# Patient Record
Sex: FEMALE | Race: WHITE | Employment: FULL TIME | URBAN - METROPOLITAN AREA
[De-identification: names, ages, dates, MRNs, and addresses within clinical notes are randomized per-mention and may not be internally consistent; named-entity substitution may affect disease eponyms.]

---

## 2017-02-24 LAB
AMB EXT ANTIBODY SCREEN: NEGATIVE
AMB EXT CHLAMYDIA, NUCLEIC ACID AMP: NEGATIVE
AMB EXT GONOCOCCUS, NUCLEIC ACID AMP: NEGATIVE
AMB EXT HBSAG SCREEN: NON REACTIVE
AMB EXT HEMATOCRIT: 39.1
AMB EXT HEMOGLOBIN: 13.3
AMB EXT HIV AG AB COMBO: NON REACTIVE
AMB EXT MCV: 90.8
AMB EXT PLATELETS: 267
AMB EXT RH FACTOR: POSITIVE
AMB EXT RUBELLA ANTIBODIES, IGG: 4.1
AMB EXT TSH: 1.05 MIU/ML
AMB EXT URINE CULTURE ROUTINE: NO GROWTH

## 2017-06-14 LAB
AMB EXT GLUCOSE 1HR OB: 105
AMB EXT HEMATOCRIT: 35.9
AMB EXT HEMOGLOBIN: 12.1
AMB EXT PLATELETS: 271

## 2017-08-21 ENCOUNTER — TELEPHONE (OUTPATIENT)
Dept: OBGYN CLINIC | Facility: CLINIC | Age: 32
End: 2017-08-21

## 2017-08-21 NOTE — TELEPHONE ENCOUNTER
LM on personal voicemail, we did not receive patient's records yet from Jermaine Guerrero our fax number left of message [phone]

## 2017-08-22 ENCOUNTER — OFFICE VISIT (OUTPATIENT)
Dept: OBGYN CLINIC | Facility: CLINIC | Age: 32
End: 2017-08-22

## 2017-08-22 DIAGNOSIS — IMO0002 MEET AND GREET FOR EXPECTANT MOTHER: Primary | ICD-10-CM

## 2017-08-22 NOTE — PROGRESS NOTES
Here for meet & greet. Currently @ [address] however recently found out all CNM's but 1 are leaving and may likey have MD at birth. Had long first labor and pushed x 2 hrs, feels needs that support from CNM that trusts at her birth.  Juan Sharpe

## 2017-08-23 ENCOUNTER — TELEPHONE (OUTPATIENT)
Dept: OBGYN CLINIC | Facility: CLINIC | Age: 32
End: 2017-08-23

## 2017-08-23 NOTE — TELEPHONE ENCOUNTER
Requested pt to contact [facility] to have her PN Record faxed to us for her OBN Appt tomorrow. Pt agrees with plan.

## 2017-08-24 ENCOUNTER — NURSE ONLY (OUTPATIENT)
Dept: OBGYN CLINIC | Facility: CLINIC | Age: 32
End: 2017-08-24

## 2017-08-24 ENCOUNTER — TELEPHONE (OUTPATIENT)
Dept: OBGYN CLINIC | Facility: CLINIC | Age: 32
End: 2017-08-24

## 2017-08-24 VITALS — WEIGHT: 165.25 LBS | BODY MASS INDEX: 26.25 KG/M2 | HEIGHT: 66.5 IN

## 2017-08-24 DIAGNOSIS — Z34.83 ENCOUNTER FOR SUPERVISION OF OTHER NORMAL PREGNANCY IN THIRD TRIMESTER: Primary | ICD-10-CM

## 2017-08-24 RX ORDER — CHOLECALCIFEROL (VITAMIN D3) 25 MCG
1 TABLET,CHEWABLE ORAL DAILY
COMMUNITY
End: 2019-07-15

## 2017-08-24 NOTE — TELEPHONE ENCOUNTER
LMTCB. Requested pt call back with the information of how far along she was in the pregnancy when she delivered her 1st baby. Need to enter into her PN record.

## 2017-08-24 NOTE — PROGRESS NOTES
Pt is a transfer of care at 36 weeks from [employer]. PN Record, labs and US are in [facility]. Pt has a cat but she does not change the litter box. TOXO titers ordered with missing NOB Labs.   Pt had an abnormal 20 week US with choroid plexus cyst

## 2017-08-30 ENCOUNTER — INITIAL PRENATAL (OUTPATIENT)
Dept: OBGYN CLINIC | Facility: CLINIC | Age: 32
End: 2017-08-30

## 2017-08-30 VITALS
HEART RATE: 77 BPM | DIASTOLIC BLOOD PRESSURE: 72 MMHG | SYSTOLIC BLOOD PRESSURE: 112 MMHG | WEIGHT: 163 LBS | BODY MASS INDEX: 26 KG/M2

## 2017-08-30 DIAGNOSIS — Z11.3 SCREEN FOR STD (SEXUALLY TRANSMITTED DISEASE): ICD-10-CM

## 2017-08-30 DIAGNOSIS — Z34.83 ENCOUNTER FOR SUPERVISION OF OTHER NORMAL PREGNANCY IN THIRD TRIMESTER: Primary | ICD-10-CM

## 2017-08-30 LAB
APPEARANCE: CLEAR
MULTISTIX LOT#: NORMAL NUMERIC
PH, URINE: 5 (ref 4.5–8)
SPECIFIC GRAVITY: 1 (ref 1–1.03)
STREP GP B CULT OB: POSITIVE
URINE-COLOR: YELLOW
UROBILINOGEN,SEMI-QN: 0 MG/DL (ref 0–1.9)

## 2017-08-30 RX ORDER — BREAST PUMP
EACH MISCELLANEOUS
Qty: 1 EACH | Refills: 0 | Status: SHIPPED | OUTPATIENT
Start: 2017-08-30 | End: 2019-02-27 | Stop reason: ALTCHOICE

## 2017-08-30 NOTE — PROGRESS NOTES
Active fetus  No signs of labor  Increasing BH contractions. Signs & symptoms of labor reviewed.  All questions answered

## 2017-09-02 LAB
SIGNAL TO CUT-OFF: 0.01
VITAMIN D, 25-OH, TOTAL: 30 NG/ML (ref 30–100)

## 2017-09-04 LAB
CHLAMYDIA TRACHOMATIS$RNA, TMA: NOT DETECTED
NEISSERIA GONORRHOEAE$RNA, TMA: NOT DETECTED

## 2017-09-05 ENCOUNTER — TELEPHONE (OUTPATIENT)
Dept: OBGYN CLINIC | Facility: CLINIC | Age: 32
End: 2017-09-05

## 2017-09-05 PROBLEM — B95.1 POSITIVE GBS TEST: Status: ACTIVE | Noted: 2017-09-05

## 2017-09-05 NOTE — TELEPHONE ENCOUNTER
Pt was advised she is to call immediately if her water bag breaks and she will go to the hospital ASAP. Pt will need to be given the IV abx prior to delivery. Pt agrees with plan.

## 2017-09-05 NOTE — TELEPHONE ENCOUNTER
Pt states she received phone call today with results. Wants to know what these results mean for delivery.

## 2017-09-06 ENCOUNTER — ROUTINE PRENATAL (OUTPATIENT)
Dept: OBGYN CLINIC | Facility: CLINIC | Age: 32
End: 2017-09-06

## 2017-09-06 VITALS
SYSTOLIC BLOOD PRESSURE: 117 MMHG | BODY MASS INDEX: 27 KG/M2 | WEIGHT: 167.19 LBS | HEART RATE: 91 BPM | DIASTOLIC BLOOD PRESSURE: 82 MMHG

## 2017-09-06 DIAGNOSIS — Z34.83 ENCOUNTER FOR SUPERVISION OF OTHER NORMAL PREGNANCY IN THIRD TRIMESTER: Primary | ICD-10-CM

## 2017-09-06 LAB
APPEARANCE: CLEAR
MULTISTIX LOT#: NORMAL NUMERIC
PH, URINE: 8 (ref 4.5–8)
SPECIFIC GRAVITY: 1.01 (ref 1–1.03)
URINE-COLOR: YELLOW
UROBILINOGEN,SEMI-QN: 0.2 MG/DL (ref 0–1.9)

## 2017-09-06 NOTE — PROGRESS NOTES
Active baby. No signs of labor. Reviewed GBS positive and PCN allergic will need vanco or clindamycin. Discussed birth plan in addition 10 mins.

## 2017-09-12 ENCOUNTER — ROUTINE PRENATAL (OUTPATIENT)
Dept: OBGYN CLINIC | Facility: CLINIC | Age: 32
End: 2017-09-12

## 2017-09-12 VITALS
SYSTOLIC BLOOD PRESSURE: 130 MMHG | BODY MASS INDEX: 27 KG/M2 | HEART RATE: 64 BPM | DIASTOLIC BLOOD PRESSURE: 79 MMHG | WEIGHT: 167 LBS

## 2017-09-12 DIAGNOSIS — Z34.83 ENCOUNTER FOR SUPERVISION OF OTHER NORMAL PREGNANCY IN THIRD TRIMESTER: Primary | ICD-10-CM

## 2017-09-12 LAB
APPEARANCE: CLEAR
MULTISTIX LOT#: NORMAL NUMERIC
PH, URINE: 7 (ref 4.5–8)
SPECIFIC GRAVITY: 1.02 (ref 1–1.03)
URINE-COLOR: YELLOW
UROBILINOGEN,SEMI-QN: 0 MG/DL (ref 0–1.9)

## 2017-09-12 PROCEDURE — 59425 ANTEPARTUM CARE ONLY: CPT | Performed by: ADVANCED PRACTICE MIDWIFE

## 2017-09-12 NOTE — PROGRESS NOTES
Baby active. No SOL. Getting more nervous about birth as last labor long & difficult. Lab did not do toxo so will have to re-draw. Importance of active FM, SOL reviewed. Post dates plans reviewed.

## 2017-09-16 ENCOUNTER — HOSPITAL ENCOUNTER (INPATIENT)
Facility: HOSPITAL | Age: 32
LOS: 2 days | Discharge: HOME | End: 2017-09-18
Attending: ADVANCED PRACTICE MIDWIFE | Admitting: OBSTETRICS & GYNECOLOGY
Payer: COMMERCIAL

## 2017-09-16 PROBLEM — Z37.9 NORMAL LABOR (HCC): Status: ACTIVE | Noted: 2017-09-16

## 2017-09-16 PROBLEM — Z34.90 PREGNANCY: Status: ACTIVE | Noted: 2017-09-16

## 2017-09-16 PROBLEM — Z37.9 NORMAL LABOR: Status: ACTIVE | Noted: 2017-09-16

## 2017-09-16 PROBLEM — Z34.90 PREGNANCY (HCC): Status: ACTIVE | Noted: 2017-09-16

## 2017-09-16 LAB
ANTIBODY SCREEN: NEGATIVE
ERYTHROCYTE [DISTWIDTH] IN BLOOD BY AUTOMATED COUNT: 13 % (ref 11–15)
HCT VFR BLD AUTO: 42.5 % (ref 35–48)
HGB BLD-MCNC: 14.5 G/DL (ref 12–16)
MCH RBC QN AUTO: 31.6 PG (ref 27–32)
MCHC RBC AUTO-ENTMCNC: 34.1 G/DL (ref 32–37)
MCV RBC AUTO: 92.6 FL (ref 80–100)
PLATELET # BLD AUTO: 226 K/UL (ref 140–400)
PMV BLD AUTO: 9.1 FL (ref 7.4–10.3)
RBC # BLD AUTO: 4.59 M/UL (ref 3.7–5.4)
RH BLOOD TYPE: POSITIVE
WBC # BLD AUTO: 15.8 K/UL (ref 4–11)

## 2017-09-16 PROCEDURE — 0HQ9XZZ REPAIR PERINEUM SKIN, EXTERNAL APPROACH: ICD-10-PCS | Performed by: ADVANCED PRACTICE MIDWIFE

## 2017-09-16 RX ORDER — SODIUM CHLORIDE 0.9 % (FLUSH) 0.9 %
10 SYRINGE (ML) INJECTION AS NEEDED
Status: DISCONTINUED | OUTPATIENT
Start: 2017-09-16 | End: 2017-09-16 | Stop reason: HOSPADM

## 2017-09-16 RX ORDER — DEXTROSE, SODIUM CHLORIDE, SODIUM LACTATE, POTASSIUM CHLORIDE, AND CALCIUM CHLORIDE 5; .6; .31; .03; .02 G/100ML; G/100ML; G/100ML; G/100ML; G/100ML
125 INJECTION, SOLUTION INTRAVENOUS CONTINUOUS
Status: DISCONTINUED | OUTPATIENT
Start: 2017-09-16 | End: 2017-09-16 | Stop reason: HOSPADM

## 2017-09-16 RX ORDER — CLINDAMYCIN PHOSPHATE 900 MG/50ML
900 INJECTION INTRAVENOUS EVERY 8 HOURS
Status: DISCONTINUED | OUTPATIENT
Start: 2017-09-16 | End: 2017-09-16 | Stop reason: HOSPADM

## 2017-09-16 RX ORDER — TRISODIUM CITRATE DIHYDRATE AND CITRIC ACID MONOHYDRATE 500; 334 MG/5ML; MG/5ML
30 SOLUTION ORAL AS NEEDED
Status: DISCONTINUED | OUTPATIENT
Start: 2017-09-16 | End: 2017-09-16 | Stop reason: HOSPADM

## 2017-09-16 RX ORDER — IBUPROFEN 600 MG/1
600 TABLET ORAL ONCE AS NEEDED
Status: DISCONTINUED | OUTPATIENT
Start: 2017-09-16 | End: 2017-09-16 | Stop reason: HOSPADM

## 2017-09-16 RX ORDER — TERBUTALINE SULFATE 1 MG/ML
0.25 INJECTION, SOLUTION SUBCUTANEOUS AS NEEDED
Status: DISCONTINUED | OUTPATIENT
Start: 2017-09-16 | End: 2017-09-16 | Stop reason: HOSPADM

## 2017-09-16 RX ORDER — LIDOCAINE HYDROCHLORIDE 10 MG/ML
30 INJECTION, SOLUTION EPIDURAL; INFILTRATION; INTRACAUDAL; PERINEURAL ONCE
Status: DISCONTINUED | OUTPATIENT
Start: 2017-09-16 | End: 2017-09-16 | Stop reason: HOSPADM

## 2017-09-16 RX ORDER — AMMONIA INHALANTS 0.04 G/.3ML
0.3 INHALANT RESPIRATORY (INHALATION) AS NEEDED
Status: DISCONTINUED | OUTPATIENT
Start: 2017-09-16 | End: 2017-09-16 | Stop reason: HOSPADM

## 2017-09-17 LAB
BASOPHILS # BLD: 0 K/UL (ref 0–0.2)
BASOPHILS NFR BLD: 0 %
EOSINOPHIL # BLD: 0.1 K/UL (ref 0–0.7)
EOSINOPHIL NFR BLD: 0 %
ERYTHROCYTE [DISTWIDTH] IN BLOOD BY AUTOMATED COUNT: 13 % (ref 11–15)
HCT VFR BLD AUTO: 39.1 % (ref 35–48)
HGB BLD-MCNC: 13.3 G/DL (ref 12–16)
LYMPHOCYTES # BLD: 1.7 K/UL (ref 1–4)
LYMPHOCYTES NFR BLD: 10 %
MCH RBC QN AUTO: 31.8 PG (ref 27–32)
MCHC RBC AUTO-ENTMCNC: 34.1 G/DL (ref 32–37)
MCV RBC AUTO: 93.2 FL (ref 80–100)
MONOCYTES # BLD: 1.2 K/UL (ref 0–1)
MONOCYTES NFR BLD: 7 %
NEUTROPHILS # BLD AUTO: 13.2 K/UL (ref 1.8–7.7)
NEUTROPHILS NFR BLD: 82 %
PLATELET # BLD AUTO: 209 K/UL (ref 140–400)
PMV BLD AUTO: 9.6 FL (ref 7.4–10.3)
RBC # BLD AUTO: 4.2 M/UL (ref 3.7–5.4)
WBC # BLD AUTO: 16.1 K/UL (ref 4–11)

## 2017-09-17 PROCEDURE — 59410 OBSTETRICAL CARE: CPT | Performed by: ADVANCED PRACTICE MIDWIFE

## 2017-09-17 RX ORDER — DOCUSATE SODIUM 100 MG/1
100 CAPSULE, LIQUID FILLED ORAL 2 TIMES DAILY
Status: DISCONTINUED | OUTPATIENT
Start: 2017-09-17 | End: 2017-09-18

## 2017-09-17 RX ORDER — IBUPROFEN 200 MG
200 TABLET ORAL EVERY 4 HOURS PRN
Status: DISCONTINUED | OUTPATIENT
Start: 2017-09-17 | End: 2017-09-18

## 2017-09-17 RX ORDER — AMMONIA INHALANTS 0.04 G/.3ML
0.3 INHALANT RESPIRATORY (INHALATION) AS NEEDED
Status: DISCONTINUED | OUTPATIENT
Start: 2017-09-17 | End: 2017-09-18

## 2017-09-17 RX ORDER — SIMETHICONE 80 MG
80 TABLET,CHEWABLE ORAL 3 TIMES DAILY PRN
Status: DISCONTINUED | OUTPATIENT
Start: 2017-09-17 | End: 2017-09-18

## 2017-09-17 RX ORDER — IBUPROFEN 600 MG/1
600 TABLET ORAL EVERY 4 HOURS PRN
Status: DISCONTINUED | OUTPATIENT
Start: 2017-09-17 | End: 2017-09-18

## 2017-09-17 RX ORDER — BISACODYL 10 MG
10 SUPPOSITORY, RECTAL RECTAL ONCE AS NEEDED
Status: DISCONTINUED | OUTPATIENT
Start: 2017-09-17 | End: 2017-09-18

## 2017-09-17 RX ORDER — PRENATAL VIT,CAL 76/IRON/FOLIC 29 MG-1 MG
1 TABLET ORAL DAILY
Status: DISCONTINUED | OUTPATIENT
Start: 2017-09-17 | End: 2017-09-18

## 2017-09-17 RX ORDER — DIAPER,BRIEF,INFANT-TODD,DISP
1 EACH MISCELLANEOUS EVERY 6 HOURS PRN
Status: DISCONTINUED | OUTPATIENT
Start: 2017-09-17 | End: 2017-09-18

## 2017-09-17 RX ORDER — IBUPROFEN 200 MG
400 TABLET ORAL EVERY 4 HOURS PRN
Status: DISCONTINUED | OUTPATIENT
Start: 2017-09-17 | End: 2017-09-18

## 2017-09-17 NOTE — PROGRESS NOTES
[place] [facility]    OB/GYNE Progress Note      [address] [facility] Patient Status:  Inpatient    1985 MRN [id number]   Location [facility] 3SE Attending Marcus Bro, [address] Day # 1 PCP Deanna Schwartz MD       As cracking or bleeding, + milky discharge  Fundus firm, non-tender, 1FB below umbilicus  Lochia: small rubra  Perineum: well approximated, no swelling, no hematoma  Calves: Non-tender, no edema, Neg Homans    DVT Risk Score: low  VTE Prophylaxis Ordered: no

## 2017-09-17 NOTE — L&D DELIVERY NOTE
[place] [facility] - [facility]    Vaginal Delivery Note    [address] [facility] Patient Status:  Inpatient    1985 MRN [id number]   Location [unfilled] Attending Jaime Johnland, [address] [facility] Day # 0 PCP Sandro Rao MD     Delivery     Suzan Rivers

## 2017-09-17 NOTE — LACTATION NOTE
LACTATION NOTE - MOTHER      Evaluation Type: Inpatient    Problems identified  Problems identified: Knowledge deficit    Maternal history  Other/comment: Amenorrhea, GBS+    Breastfeeding goal  Breastfeeding goal: To maintain breast milk feeding per patie

## 2017-09-17 NOTE — H&P
[facility] Patient Status:  Inpatient    1985 MRN [id number]   Location [address] [facility] Attending Poonam Almonte, [address] Day # 0 PCP Traci Rice, Review of Systems:   As documented in HPI    Physical Exam:   Temp:  [98.4 °F (36.9 °C)] 98.4 °F (36.9 °C)  Pulse:  [74-78] 78  Resp:  [18] 18  BP: (125-128)/(76-81) 125/81    Constitutional: alert, appears stated age and cooperative  Abdomen: fundus

## 2017-09-17 NOTE — PLAN OF CARE
ANXIETY    • Will report anxiety at manageable levels Progressing        PAIN - ADULT    • Verbalizes/displays adequate comfort level or patient's stated pain goal Progressing        Patient Centered Care    • Patient preferences are identified and Winnie Journey

## 2017-09-18 VITALS
TEMPERATURE: 98 F | RESPIRATION RATE: 16 BRPM | DIASTOLIC BLOOD PRESSURE: 72 MMHG | SYSTOLIC BLOOD PRESSURE: 117 MMHG | HEART RATE: 68 BPM

## 2017-09-18 LAB — T PALLIDUM AB SER QL: NEGATIVE

## 2017-09-18 NOTE — LACTATION NOTE
LACTATION NOTE - MOTHER      Evaluation Type: Inpatient    Problems identified  Problems identified: Knowledge deficit;Milk supply WNL    Maternal history  Other/comment: Amenorrhea, GBS+    Breastfeeding goal  Breastfeeding goal: To maintain breast milk f

## 2017-09-18 NOTE — PROGRESS NOTES
[place] [facility] - [facility]    OB/GYNE Progress Note      [address] [facility] Patient Status:  Inpatient    1985 MRN [id number]   Location [facility] 3SE Attending Parrish Hanley, [address] Day # 2 PCP Keith Sims MD       As 09/16/2017   WBC 16.1 (H) 09/17/2017   HGB 13.3 09/17/2017   HCT 39.1 09/17/2017    09/17/2017   TSH 1.050 02/24/2017         Lab Results  Component Value Date   SPECGRAVITY 1.020 09/12/2017   GLUUR NEG 09/12/2017   NITRITE NEG 09/12/2017

## 2017-09-18 NOTE — DISCHARGE SUMMARY
[place] [facility] - [facility]    Discharge Summary    Remedios Garcia Patient Status:  Inpatient    1985 MRN [id number]   Location [facility] 3SE Attending Marcus Bro, [address] Day # 2       Delivering OB Clinician: [facility]

## 2017-09-18 NOTE — LACTATION NOTE
This note was copied from a baby's chart.   LACTATION NOTE - INFANT    Evaluation Type  Evaluation Type: Inpatient    Problems & Assessment  Problems Diagnosed or Identified: Shallow latch  Problems: comment/detail: Advised to hold infant in close to encour

## 2017-09-20 ENCOUNTER — TELEPHONE (OUTPATIENT)
Dept: PEDIATRICS CLINIC | Facility: CLINIC | Age: 32
End: 2017-09-20

## 2017-09-20 ENCOUNTER — POSTPARTUM (OUTPATIENT)
Dept: OBGYN CLINIC | Facility: CLINIC | Age: 32
End: 2017-09-20

## 2017-09-20 VITALS
BODY MASS INDEX: 23.93 KG/M2 | HEART RATE: 91 BPM | SYSTOLIC BLOOD PRESSURE: 122 MMHG | HEIGHT: 67 IN | WEIGHT: 152.5 LBS | TEMPERATURE: 99 F | DIASTOLIC BLOOD PRESSURE: 87 MMHG

## 2017-09-20 PROCEDURE — 99213 OFFICE O/P EST LOW 20 MIN: CPT | Performed by: ADVANCED PRACTICE MIDWIFE

## 2017-09-20 RX ORDER — CEPHALEXIN 500 MG/1
500 CAPSULE ORAL 4 TIMES DAILY
Qty: 28 CAPSULE | Refills: 0 | Status: SHIPPED | OUTPATIENT
Start: 2017-09-20 | End: 2017-11-06

## 2017-09-20 NOTE — TELEPHONE ENCOUNTER
Pt thinks she has a breast infection ,,Pt had a low grade fever yesterday and breast tenderness , pt is breast feeding ,

## 2017-09-20 NOTE — TELEPHONE ENCOUNTER
Spoke with pt who reports she thinks she has breast infection. Pt states both breasts are tender and warm to the touch. Pt reports symptoms began this Monday.  Pt states she checked her temperature yesterday and it was 99.5, has not checked temperature toda

## 2017-09-20 NOTE — PROGRESS NOTES
HPI:   Natividad Brown is a 27year old female who presents for a breast complaint. 4 days PP and has noticed red areas and tender lumps. Had 99.4 temp yesterday. No chills, + fatigue.      Wt Readings from Last 3 Encounters:  09/20/17 : 152 lb BMI 23.88 kg/m²   GENERAL: well developed, well nourished,in no apparent distress  SKIN: no rashes,no suspicious lesions  HEENT: atraumatic, normocephalic  BREAST: symmetrical, nipples intact, bilateral milk discharge, RIght breast with palpable axillary 3

## 2017-09-20 NOTE — TELEPHONE ENCOUNTER
Pt was advised per MBW she is to take Lecithin 1200 mg 3-4 times daily to combat the plugged ducts. Pt agrees with plan.

## 2017-09-28 ENCOUNTER — TELEPHONE (OUTPATIENT)
Dept: OBGYN CLINIC | Facility: CLINIC | Age: 32
End: 2017-09-28

## 2017-09-30 NOTE — TELEPHONE ENCOUNTER
Notified MBW that we have been unable to reach pt. She states she spoke w/ pt's  & he said she was doing better.  No further need to try & contact pt per MBW

## 2017-11-06 ENCOUNTER — POSTPARTUM (OUTPATIENT)
Dept: OBGYN CLINIC | Facility: CLINIC | Age: 32
End: 2017-11-06

## 2017-11-06 VITALS
HEART RATE: 61 BPM | WEIGHT: 146 LBS | BODY MASS INDEX: 23 KG/M2 | DIASTOLIC BLOOD PRESSURE: 75 MMHG | SYSTOLIC BLOOD PRESSURE: 117 MMHG

## 2017-11-06 NOTE — PROGRESS NOTES
Patient here for postpartum check-up. Vaginal delivery @ 6 weeks ago with ISABEL ALONSO. Breastfeeding exclusively, on demand. Baby with adequate weight gain. Denies fevers, chills, body aches and flu-like symptoms.   Denies abdominal pain, no pelvic pain, repor

## 2019-01-16 ENCOUNTER — TELEPHONE (OUTPATIENT)
Dept: INTERNAL MEDICINE CLINIC | Facility: CLINIC | Age: 34
End: 2019-01-16

## 2019-01-16 NOTE — TELEPHONE ENCOUNTER
Patient called and stated she has a cough, and requested to be seen at 12 noon today, Please advise.

## 2019-01-17 ENCOUNTER — OFFICE VISIT (OUTPATIENT)
Dept: INTERNAL MEDICINE CLINIC | Facility: CLINIC | Age: 34
End: 2019-01-17
Payer: COMMERCIAL

## 2019-01-17 VITALS
DIASTOLIC BLOOD PRESSURE: 74 MMHG | OXYGEN SATURATION: 97 % | RESPIRATION RATE: 16 BRPM | SYSTOLIC BLOOD PRESSURE: 126 MMHG | HEIGHT: 67 IN | HEART RATE: 64 BPM | BODY MASS INDEX: 20.09 KG/M2 | WEIGHT: 128 LBS

## 2019-01-17 DIAGNOSIS — B96.89 ACUTE BACTERIAL RHINOSINUSITIS: Primary | ICD-10-CM

## 2019-01-17 DIAGNOSIS — J01.90 ACUTE BACTERIAL RHINOSINUSITIS: Primary | ICD-10-CM

## 2019-01-17 PROCEDURE — 99203 OFFICE O/P NEW LOW 30 MIN: CPT | Performed by: NURSE PRACTITIONER

## 2019-01-17 RX ORDER — CODEINE PHOSPHATE AND GUAIFENESIN 10; 100 MG/5ML; MG/5ML
5 SOLUTION ORAL NIGHTLY PRN
Qty: 120 ML | Refills: 0 | Status: SHIPPED | OUTPATIENT
Start: 2019-01-17 | End: 2019-02-05

## 2019-01-17 RX ORDER — AMOXICILLIN AND CLAVULANATE POTASSIUM 875; 125 MG/1; MG/1
1 TABLET, FILM COATED ORAL 2 TIMES DAILY
Qty: 20 TABLET | Refills: 0 | Status: SHIPPED | OUTPATIENT
Start: 2019-01-17 | End: 2019-01-27

## 2019-01-17 NOTE — PROGRESS NOTES
HPI:    Patient ID: Arun Mathew is a 28year old female. Patient presents with:  Cough: sinus congestoin, cough, runny nose x1 month      Sinus Problem   This is a recurrent problem. The current episode started more than 1 month ago.  The Substance and Sexual Activity      Alcohol use: No      Drug use: No    Other Topics      Concerns:        Blood Transfusions: No        Caffeine Concern: No        Occupational Exposure: No        Weight Concern: No        Special Diet: No        Exercise LMP  (Approximate)   SpO2 97%   BMI 20.05 kg/m²   Physical Exam   Vitals reviewed. Constitutional: She is oriented to person, place, and time. She appears well-developed and well-nourished.    HENT:   Right Ear: Tympanic membrane and ear canal normal.   L

## 2019-02-05 ENCOUNTER — OFFICE VISIT (OUTPATIENT)
Dept: INTERNAL MEDICINE CLINIC | Facility: CLINIC | Age: 34
End: 2019-02-05
Payer: COMMERCIAL

## 2019-02-05 ENCOUNTER — HOSPITAL ENCOUNTER (OUTPATIENT)
Dept: GENERAL RADIOLOGY | Age: 34
Discharge: HOME | End: 2019-02-05
Attending: NURSE PRACTITIONER
Payer: COMMERCIAL

## 2019-02-05 ENCOUNTER — TELEPHONE (OUTPATIENT)
Dept: INTERNAL MEDICINE CLINIC | Facility: CLINIC | Age: 34
End: 2019-02-05

## 2019-02-05 VITALS
DIASTOLIC BLOOD PRESSURE: 72 MMHG | BODY MASS INDEX: 20.09 KG/M2 | OXYGEN SATURATION: 97 % | TEMPERATURE: 98 F | WEIGHT: 128 LBS | HEIGHT: 67 IN | HEART RATE: 78 BPM | SYSTOLIC BLOOD PRESSURE: 128 MMHG | RESPIRATION RATE: 18 BRPM

## 2019-02-05 DIAGNOSIS — R05.9 COUGH: ICD-10-CM

## 2019-02-05 DIAGNOSIS — R06.02 SHORTNESS OF BREATH: ICD-10-CM

## 2019-02-05 DIAGNOSIS — J01.90 ACUTE BACTERIAL RHINOSINUSITIS: ICD-10-CM

## 2019-02-05 DIAGNOSIS — R05.9 COUGH: Primary | ICD-10-CM

## 2019-02-05 DIAGNOSIS — B96.89 ACUTE BACTERIAL RHINOSINUSITIS: ICD-10-CM

## 2019-02-05 PROCEDURE — 99213 OFFICE O/P EST LOW 20 MIN: CPT | Performed by: NURSE PRACTITIONER

## 2019-02-05 PROCEDURE — 71046 X-RAY EXAM CHEST 2 VIEWS: CPT | Performed by: NURSE PRACTITIONER

## 2019-02-05 RX ORDER — ALBUTEROL SULFATE 90 UG/1
1 AEROSOL, METERED RESPIRATORY (INHALATION) EVERY 6 HOURS PRN
Qty: 1 INHALER | Refills: 3 | Status: SHIPPED | OUTPATIENT
Start: 2019-02-05 | End: 2019-02-18

## 2019-02-05 RX ORDER — BENZONATATE 200 MG/1
200 CAPSULE ORAL 3 TIMES DAILY PRN
Qty: 20 CAPSULE | Refills: 0 | Status: SHIPPED | OUTPATIENT
Start: 2019-02-05 | End: 2019-02-18

## 2019-02-05 NOTE — TELEPHONE ENCOUNTER
Patient c/o rib pain due to increased coughing. States she was seen and treated on Saturday for strep throat with Amoxicillin x10 days. Patient states the cough has persisted for 2 months and it is now getting worse.  States she has tried otc cough suppre

## 2019-02-05 NOTE — TELEPHONE ENCOUNTER
Patient called and stated she was seen 2-3 weeks ago for a cough, and given antibiotics, but then she went to a clinic and had strep throat, and was given antibiotics again. She is coughing more, and wants to be advised.

## 2019-02-05 NOTE — PROGRESS NOTES
HPI:    Patient ID: Adriana Steele is a 28year old female. Pt has had a cough x1mo that has not improved.  She was also seen in urgent care Saturday and was given amoxicillin for a positive strep test. Her congestion, fatigue, and sore thro decreased breath sounds in the right lower field. Musculoskeletal:   Tenderness over right lateral ribs   Skin: Skin is warm and dry. Psychiatric: She has a normal mood and affect. Her behavior is normal.   Vitals reviewed.   /72   Pulse 78   Temp

## 2019-02-13 ENCOUNTER — TELEPHONE (OUTPATIENT)
Dept: INTERNAL MEDICINE CLINIC | Facility: CLINIC | Age: 34
End: 2019-02-13

## 2019-02-13 RX ORDER — BUDESONIDE AND FORMOTEROL FUMARATE DIHYDRATE 160; 4.5 UG/1; UG/1
1 AEROSOL RESPIRATORY (INHALATION) 2 TIMES DAILY
Qty: 1 INHALER | Refills: 0 | COMMUNITY
Start: 2019-02-13 | End: 2019-02-18

## 2019-02-13 NOTE — TELEPHONE ENCOUNTER
Spoke with pt she continues to cough despite tessalon and albuterol. She has coughing fits that last up to 30 minutes. She is currently breast feeding as well. Per Dr. Florina Guillory Symbicort 1 puff BID x 7 days. Sample ready for .      D/w pt above

## 2019-02-13 NOTE — TELEPHONE ENCOUNTER
Patient called and stated she was seen for a cough, and completed an Xray but still hasn't subsided. Please advise.

## 2019-02-14 ENCOUNTER — TELEPHONE (OUTPATIENT)
Dept: INTERNAL MEDICINE CLINIC | Facility: CLINIC | Age: 34
End: 2019-02-14

## 2019-02-14 NOTE — TELEPHONE ENCOUNTER
Symbicort information from [employer]:     Lactation  Clinical Summary   budesonide inhaled: may use while breastfeeding; no known risk of infant harm based on human data and minimal drug excretion into milk; no human data available to assess effects on mil

## 2019-02-18 ENCOUNTER — TELEPHONE (OUTPATIENT)
Dept: INTERNAL MEDICINE CLINIC | Facility: CLINIC | Age: 34
End: 2019-02-18

## 2019-02-18 DIAGNOSIS — R05.9 COUGH: Primary | ICD-10-CM

## 2019-02-18 RX ORDER — IPRATROPIUM BROMIDE AND ALBUTEROL SULFATE 2.5; .5 MG/3ML; MG/3ML
3 SOLUTION RESPIRATORY (INHALATION) EVERY 8 HOURS PRN
Qty: 90 CONTAINER | Refills: 0 | Status: SHIPPED | OUTPATIENT
Start: 2019-02-18 | End: 2019-03-11

## 2019-02-18 NOTE — TELEPHONE ENCOUNTER
Spoke with rep at [facility]# [phone], and they are contracted with patient's [employer]. Please fax order, patient's insurance, and demographics to [phone] ATTN: INTAKE.

## 2019-02-18 NOTE — TELEPHONE ENCOUNTER
Per Dr. Camden Orellana stop Symbicort and Albuterol and begin Duoneb 1 nebule TID PRN, Nebulizer with kit. D/w pt above she expressed understanding. Rxs sent.

## 2019-02-18 NOTE — TELEPHONE ENCOUNTER
Order and requested documents faxed. Pt notified and given contact information to f/u on delivery and was advised to continue inhalers until she receives the nebulizer. Pt expressed understanding.

## 2019-02-18 NOTE — TELEPHONE ENCOUNTER
Patient says she has been using Symbicort since Thursday, which has helped her cough during the day, but she is still up most of the night coughing, to the point that her ribs hurt. Please c/b on work # to advise what else she can do for treatment.

## 2019-02-18 NOTE — TELEPHONE ENCOUNTER
[facility] called back stating orders now need to be placed electronically and they will fax over information. Attempted to place order electronically and [facility] has blocked the website.     Called and spoke with customer service they will have

## 2019-02-18 NOTE — TELEPHONE ENCOUNTER
Patient called and stated her nebulizer is not covered by her insurance. She was told to call back if not covered, to be given an alternative.

## 2019-02-18 NOTE — TELEPHONE ENCOUNTER
I called [facility]bigail Borges, at [phone], and spoke with a rep. They are contracted with [facility], and please fax order, patient's demographics, and insurance cards to [phone]. Patient supplies, nebulizer will be shipped from their [place] branch.

## 2019-02-21 ENCOUNTER — TELEPHONE (OUTPATIENT)
Dept: INTERNAL MEDICINE CLINIC | Facility: CLINIC | Age: 34
End: 2019-02-21

## 2019-02-21 NOTE — TELEPHONE ENCOUNTER
The pt was seen in the office on 2/5/2019 for a cough. The pt was treated with amoxicillin and Cheratussin from a pervious ED visit. The pt was then given an Rx for tessalon and albulterol.    Chest x-ray was completed and was wnl:   CONCLUSION:  No acute f

## 2019-02-21 NOTE — TELEPHONE ENCOUNTER
Patient called and stated she used her nebulizer for the first time, and noticed her cough is getting worse- she is wheezing; asking if she should use her 2 inhalers instead?

## 2019-02-25 ENCOUNTER — TELEPHONE (OUTPATIENT)
Dept: INTERNAL MEDICINE CLINIC | Facility: CLINIC | Age: 34
End: 2019-02-25

## 2019-02-25 DIAGNOSIS — R05.3 CHRONIC COUGH: Primary | ICD-10-CM

## 2019-02-25 NOTE — TELEPHONE ENCOUNTER
Pt states she is still coughing at night for about 2-3 hours,she is still taking her nebulizer and wondering what else she can do, call back

## 2019-02-25 NOTE — TELEPHONE ENCOUNTER
The pt states is up for 2-3 hours at night due to coughing. The pt states has been sleeping upright. The pt states is still having a dry cough. The pt has started using the nebulizer that has helped during the day.  The pt is having a worse cough at nig

## 2019-02-27 PROCEDURE — 86615 BORDETELLA ANTIBODY: CPT | Performed by: INTERNAL MEDICINE

## 2019-02-27 PROCEDURE — 36415 COLL VENOUS BLD VENIPUNCTURE: CPT | Performed by: INTERNAL MEDICINE

## 2019-03-11 ENCOUNTER — OFFICE VISIT (OUTPATIENT)
Dept: OBGYN CLINIC | Facility: CLINIC | Age: 34
End: 2019-03-11
Payer: COMMERCIAL

## 2019-03-11 VITALS
HEIGHT: 67.5 IN | WEIGHT: 132.63 LBS | HEART RATE: 71 BPM | DIASTOLIC BLOOD PRESSURE: 75 MMHG | SYSTOLIC BLOOD PRESSURE: 116 MMHG | BODY MASS INDEX: 20.57 KG/M2

## 2019-03-11 DIAGNOSIS — Z01.419 ENCOUNTER FOR ANNUAL ROUTINE GYNECOLOGICAL EXAMINATION: Primary | ICD-10-CM

## 2019-03-11 DIAGNOSIS — Z12.4 SCREENING FOR MALIGNANT NEOPLASM OF CERVIX: ICD-10-CM

## 2019-03-11 PROCEDURE — 99395 PREV VISIT EST AGE 18-39: CPT | Performed by: ADVANCED PRACTICE MIDWIFE

## 2019-03-11 NOTE — PROGRESS NOTES
HPI:    Patient ID: Veronique Robbins is a 28year old female. She presents for annual exam. She denies history of medical problems or any health complaints. Last pap 2/2015, denies history of abnormal pap. Reports periods are regular.  Patient's la well-nourished. No distress. Neck: Normal range of motion. Neck supple. No thyromegaly present. Cardiovascular: Normal rate, regular rhythm and normal heart sounds.    Pulmonary/Chest: Effort normal and breath sounds normal. Right breast exhibits no inv healthy diet and exercise as well as breast self awareness. Reviewed preconception counseling and encouraged PNV. RTC annually or PRN.          Orders Placed This Encounter      THINPREP TIS AND HPV MRNA E6/E7      Meds This Visit:  Requested Shira [phone]

## 2019-03-13 LAB — HPV MRNA E6/E7: NOT DETECTED

## 2020-05-11 ENCOUNTER — TELEMEDICINE (OUTPATIENT)
Dept: INTERNAL MEDICINE CLINIC | Facility: CLINIC | Age: 35
End: 2020-05-11
Payer: COMMERCIAL

## 2020-05-11 DIAGNOSIS — T78.1XXA ALLERGIC REACTION TO FOOD, INITIAL ENCOUNTER: Primary | ICD-10-CM

## 2020-05-11 PROCEDURE — 99213 OFFICE O/P EST LOW 20 MIN: CPT | Performed by: NURSE PRACTITIONER

## 2020-05-11 NOTE — PROGRESS NOTES
HPI:    Patient ID: Rudy Gomez is a 27year old female. Patient presents with:  Rash    This visit is conducted using Telemedicine with live, interactive video and audio. Noted rash this am to arms, abdomen, chest and legs this morning.  Dexter Winkler Component Value Date    WBC 16.1 (H) 09/17/2017    RBC 4.20 09/17/2017    HGB 13.3 09/17/2017    HCT 39.1 09/17/2017    MCV 93.2 09/17/2017    MCH 31.8 09/17/2017    MCHC 34.1 09/17/2017    RDW 13.0 09/17/2017     09/17/2017    MPV 9.6 09/17/2017

## 2020-05-13 ENCOUNTER — LAB ENCOUNTER (OUTPATIENT)
Dept: LAB | Facility: HOSPITAL | Age: 35
End: 2020-05-13
Attending: NURSE PRACTITIONER
Payer: COMMERCIAL

## 2020-05-13 DIAGNOSIS — Z20.822 ENCOUNTER FOR LABORATORY TESTING FOR COVID-19 VIRUS: Primary | ICD-10-CM

## 2020-05-13 DIAGNOSIS — Z20.822 ENCOUNTER FOR LABORATORY TESTING FOR COVID-19 VIRUS: ICD-10-CM

## 2022-05-21 LAB — AMB EXT COVID-19 RESULT: DETECTED

## 2022-05-24 ENCOUNTER — TELEPHONE (OUTPATIENT)
Dept: INTERNAL MEDICINE CLINIC | Facility: CLINIC | Age: 37
End: 2022-05-24

## 2022-05-24 NOTE — TELEPHONE ENCOUNTER
Patient scheduled appointment.      Future Appointments   Date Time Provider Leandro Roblero   6/3/2022 12:30 PM YAN Coello [facility] [address]

## 2022-05-24 NOTE — TELEPHONE ENCOUNTER
Pt took covid test on 5/21, result was positive    Onset of symptoms was also on 5/21    Pt will be traveling to [place] on 6/17  Returning 7/5    Pt is looking for documentation for travel she no longer has covid.

## 2022-05-24 NOTE — TELEPHONE ENCOUNTER
Confirmed COVID f/u call  Symptom onset: 5/21  Positive test date: 5/21   Isolation period ends: 5/26 if fever free and symptoms have improved  Mask wearing begins: 5/26 through 5/31  Restrictions end:   Temperature: denies fever   Cough: pt states no   Shortness of breath: pt states no   Any other symptoms: congestion   Pulse ox:  Does not have an pulse ox at home to be able to assess    D/w pt isolation period, mask wearing period and when restrictions end. Pt was advised of the following:      - when to get covid booster if applicable  - contact office with worsening non emergent symptoms or further questions  - proceed to ER if any of the following begin:  worsening difficulty breathing/shortness of breath, high fever not controlled by fever reducing agents and pulse ox less than 90% if monitoring. Pt expressed understanding. Pt states she is travelling to [facility] in June and wants to ensure she is negative. She states she needs documentation that she is ok to travel and has recovered from [facility]. Pt states she is going to try to find the document she needs to be completed and will provide it to us. Advised patient to please upload through [address] or can drop off here at the office. Patient verbalized understanding.

## 2022-05-24 NOTE — TELEPHONE ENCOUNTER
Called patient and advised can be seen in office June 1st or after for a follow up and to provide this documentation.

## 2022-06-03 ENCOUNTER — OFFICE VISIT (OUTPATIENT)
Dept: INTERNAL MEDICINE CLINIC | Facility: CLINIC | Age: 37
End: 2022-06-03
Payer: COMMERCIAL

## 2022-06-03 VITALS
HEART RATE: 55 BPM | RESPIRATION RATE: 17 BRPM | TEMPERATURE: 98 F | BODY MASS INDEX: 19.62 KG/M2 | SYSTOLIC BLOOD PRESSURE: 106 MMHG | WEIGHT: 125 LBS | HEIGHT: 67 IN | OXYGEN SATURATION: 98 % | DIASTOLIC BLOOD PRESSURE: 72 MMHG

## 2022-06-03 DIAGNOSIS — Z00.00 LABORATORY EXAM ORDERED AS PART OF ROUTINE GENERAL MEDICAL EXAMINATION: ICD-10-CM

## 2022-06-03 DIAGNOSIS — Z91.018 MULTIPLE FOOD ALLERGIES: ICD-10-CM

## 2022-06-03 DIAGNOSIS — Z00.00 ANNUAL PHYSICAL EXAM: Primary | ICD-10-CM

## 2022-06-03 PROBLEM — U07.1 COVID: Status: ACTIVE | Noted: 2022-06-03

## 2022-06-03 PROBLEM — B95.1 POSITIVE GBS TEST: Status: RESOLVED | Noted: 2017-09-05 | Resolved: 2022-06-03

## 2022-06-03 PROBLEM — Z34.90 PREGNANCY: Status: RESOLVED | Noted: 2017-09-16 | Resolved: 2022-06-03

## 2022-06-03 PROBLEM — Z34.90 PREGNANCY (HCC): Status: RESOLVED | Noted: 2017-09-16 | Resolved: 2022-06-03

## 2022-06-03 PROBLEM — Z37.9 NORMAL LABOR: Status: RESOLVED | Noted: 2017-09-16 | Resolved: 2022-06-03

## 2022-06-03 PROBLEM — Z37.9 NORMAL LABOR (HCC): Status: RESOLVED | Noted: 2017-09-16 | Resolved: 2022-06-03

## 2022-06-03 PROCEDURE — 3078F DIAST BP <80 MM HG: CPT

## 2022-06-03 PROCEDURE — 99395 PREV VISIT EST AGE 18-39: CPT

## 2022-06-03 PROCEDURE — 3008F BODY MASS INDEX DOCD: CPT

## 2022-06-03 PROCEDURE — 3074F SYST BP LT 130 MM HG: CPT

## 2024-05-10 ENCOUNTER — OFFICE VISIT (OUTPATIENT)
Dept: INTERNAL MEDICINE CLINIC | Facility: CLINIC | Age: 39
End: 2024-05-10
Payer: COMMERCIAL

## 2024-05-10 VITALS
HEART RATE: 58 BPM | SYSTOLIC BLOOD PRESSURE: 122 MMHG | RESPIRATION RATE: 14 BRPM | BODY MASS INDEX: 21.19 KG/M2 | HEIGHT: 67 IN | OXYGEN SATURATION: 98 % | TEMPERATURE: 97 F | DIASTOLIC BLOOD PRESSURE: 70 MMHG | WEIGHT: 135 LBS

## 2024-05-10 DIAGNOSIS — Z00.00 ROUTINE PHYSICAL EXAMINATION: Primary | ICD-10-CM

## 2024-05-10 DIAGNOSIS — Z00.00 LABORATORY EXAM ORDERED AS PART OF ROUTINE GENERAL MEDICAL EXAMINATION: ICD-10-CM

## 2024-05-10 DIAGNOSIS — Z12.31 ENCOUNTER FOR SCREENING MAMMOGRAM FOR BREAST CANCER: ICD-10-CM

## 2024-05-10 DIAGNOSIS — N64.4 BREAST PAIN IN FEMALE: ICD-10-CM

## 2024-05-10 PROBLEM — U07.1 COVID: Status: RESOLVED | Noted: 2022-06-03 | Resolved: 2024-05-10

## 2024-05-10 PROCEDURE — 99395 PREV VISIT EST AGE 18-39: CPT | Performed by: PHYSICIAN ASSISTANT

## 2024-05-10 NOTE — PATIENT INSTRUCTIONS
Have labs drawn, fasting 8-10 hours prior (water before is ok).    If breast pain persists: contact us and we will order diagnostic imaging to be done now (instead of waiting until after your birthday for mammogram)      Gynecologists we recommend:  Dr Kylie Vann  Or any other [facility]  [phone]  
[Follow-Up: _____] : a [unfilled] follow-up visit

## 2024-05-10 NOTE — PROGRESS NOTES
Wellness Exam    CC: Patient is presenting for a wellness exam    HPI:   Concerns: has some b/l lateral breast pain, symmetric, nursing daughter at night only,     Diet is general, balanced, exercise: not regular, busy, 3 kids.    Gyne:     Health Maintenance   Topic Date Due    Pap Smear  05/10/2025 (Originally 3/11/2022)         Denies pelvic pain, abnormal discharge or genital lesions.    Breast Cancer Screening:  No recommendations at this time   Regular self breast exam: y.     Colon cancer screening:  No recommendations at this time       Pertinent Family History:   Family History   Problem Relation Age of Onset    Cancer Maternal Grandmother         Breast      Past Medical History:    Amenorrhea    Irregular menses prior to 1st pregnancy    Bronchospasm    COVID    Dyspnea    Normal labor (HCC)     (normal spontaneous vaginal delivery) (HCC)     History reviewed. No pertinent surgical history.  Social History     Socioeconomic History    Marital status:      Spouse name: Oscar Vasquez    Number of children: 1    Years of education: 18   Occupational History    Occupation: Manager     Comment: Full time   Tobacco Use    Smoking status: Never    Smokeless tobacco: Never   Vaping Use    Vaping status: Never Used   Substance and Sexual Activity    Alcohol use: No    Drug use: No   Other Topics Concern    Blood Transfusions No    Caffeine Concern No    Occupational Exposure No    Weight Concern No    Special Diet No    Exercise No    Bike Helmet Yes    Seat Belt Yes     Social Determinants of Health      Received from [facility]    Social Connections    Received from [facility]    Housing Stability     No current outpatient medications on file prior to visit.     No current facility-administered medications on file prior to visit.       Review of Systems   Constitutional: Negative for fever, chills and fatigue.   HENT: Negative for hearing loss, congestion,  sore throat and neck pain.    Eyes: Negative for pain and visual disturbance.   Respiratory: Negative for cough and shortness of breath.    Cardiovascular: Negative for chest pain and palpitations.   Gastrointestinal: Negative for nausea, vomiting, abdominal pain and diarrhea.   Genitourinary: Negative for urgency, frequency of urination, and abnormal vaginal bleeding.   Musculoskeletal: Negative for arthralgias and gait problem.   Skin: Negative for color change and rash.   Neurological: Negative for tremors, weakness and numbness.   Hematological: Negative for adenopathy. Does not bruise/bleed easily.   Psychiatric/Behavioral: Negative for confusion and agitation. The patient is not nervous/anxious.      /70   Pulse 58   Temp 97.4 °F (36.3 °C)   Resp 14   Ht 5' 7\" (1.702 m)   Wt 135 lb (61.2 kg)   LMP 04/23/2024 (Exact Date)   SpO2 98%   Breastfeeding Yes   BMI 21.14 kg/m²   Physical Exam   Constitutional: She is oriented to person, place, and time. She appears well-developed. No distress.    Head: Normocephalic and atraumatic.   Eyes: EOM are normal. Pupils are equal, round, and reactive to light. No scleral icterus.   ENT: TM's clear, nose normal, no oropharyngeal exudates or tonsillar hypertrophy    Neck: Normal range of motion. No thyromegaly present.   Cardiovascular: Normal rate, regular rhythm and normal heart sounds.  No murmur or friction rub heard.  Pulmonary/Chest: Effort normal and breath sounds normal bilaterally. She has no wheezes or rales.   Breasts:  Appearance symmetrical without dimpling or discharge.  No masses appreciated b/l.   Abdominal: Soft. Bowel sounds are normal. There is no tenderness. No HSM.  Musculoskeletal: Normal range of motion. She exhibits no edema.   Lymphadenopathy: She has no cervical, supraclavicular, or axillary adenopathy.   Neurological: She is alert and oriented to person, place, and time. DTRs are +2 and symmetric. Cranial nerves grossly intact.  Skin:  Skin is warm. No rash noted. No erythema, pallor or jaundice.   Psychiatric: She has a normal mood and affect and her behavior is normal.     Assessment and Plan:  Remedios Vasquez is a 38 year old female here for a wellness exam.  Age appropriate cancer screening, labs, safety, immunizations were discussed with the patient and ordered as follows:  1. Routine physical examination (Primary)  2. Laboratory exam ordered as part of routine general medical examination  3. Encounter for screening mammogram for breast cancer  -     [facility] MARGO 2D+3D SCREENING BILAT (CPT=77067/78753); Future; Expected date: 11/25/2024  4. Breast pain in female   New B/l lateral breast pain x 2 wks, symmetric, seems possibly related to position of her daughter while she breastfeeds at night. She will try and change position and monitor closely. If pain not resolved in 1-2 weeks, or if any new symptoms, pt will notify us so that diagnostic imaging may be done now.    Discussed use of sunscreen, wearing seatbelt, recommend regular cardiovascular and weight bearing exercise as well as a well-rounded diet.    Return in about 1 year (around 5/10/2025) for routine physical, or sooner as needed.     Patient/Caregiver Education:  Patient/Caregiver Education: There are no barriers to learning. Medical education done.  Outcome: Patient verbalizes understanding.       Educated by: EDIL

## 2024-06-27 ENCOUNTER — TELEPHONE (OUTPATIENT)
Dept: INTERNAL MEDICINE CLINIC | Facility: CLINIC | Age: 39
End: 2024-06-27

## 2024-06-27 NOTE — TELEPHONE ENCOUNTER
Requesting Revised Order  Bilateral order for all patients   30+     US BREASTDiagnostic BILATERAL Mammogram COMPLETE (CPT=76641-50) ([id number]) [[id number]] (Order [id number])     Add additional order in Epic please

## 2024-07-01 NOTE — TELEPHONE ENCOUNTER
Karoline Sommer PA-C   to [facility] Clinical Staff       6/22/24  7:07 AM  Note      Recommend b/l whole breast ultrasound, dx b/l breast pain and current breastfeeding status.  Once patient has stopped breastfeeding completely, recommend b/l diagnostic mammo        Provider does not recommend mammogram at this time. Per above- recommend b/l diagnostic mammogram after patient has stopped breast feeding. See 6/21 [employer] message documentation.

## 2024-08-26 ENCOUNTER — HOSPITAL ENCOUNTER (OUTPATIENT)
Dept: MAMMOGRAPHY | Facility: HOSPITAL | Age: 39
Discharge: HOME | End: 2024-08-26
Attending: PHYSICIAN ASSISTANT
Payer: COMMERCIAL

## 2024-08-26 DIAGNOSIS — N64.4 BREAST PAIN: ICD-10-CM

## 2024-08-26 PROCEDURE — 76641 ULTRASOUND BREAST COMPLETE: CPT | Performed by: PHYSICIAN ASSISTANT

## 2025-06-11 ENCOUNTER — OFFICE VISIT (OUTPATIENT)
Dept: INTERNAL MEDICINE CLINIC | Facility: CLINIC | Age: 40
End: 2025-06-11
Payer: COMMERCIAL

## 2025-06-11 VITALS
TEMPERATURE: 98 F | DIASTOLIC BLOOD PRESSURE: 60 MMHG | HEART RATE: 78 BPM | SYSTOLIC BLOOD PRESSURE: 108 MMHG | BODY MASS INDEX: 21.19 KG/M2 | OXYGEN SATURATION: 99 % | WEIGHT: 135 LBS | HEIGHT: 67 IN | RESPIRATION RATE: 18 BRPM

## 2025-06-11 DIAGNOSIS — Z12.31 ENCOUNTER FOR SCREENING MAMMOGRAM FOR MALIGNANT NEOPLASM OF BREAST: ICD-10-CM

## 2025-06-11 DIAGNOSIS — Z00.00 ROUTINE PHYSICAL EXAMINATION: Primary | ICD-10-CM

## 2025-06-11 DIAGNOSIS — Z00.00 LABORATORY EXAM ORDERED AS PART OF ROUTINE GENERAL MEDICAL EXAMINATION: ICD-10-CM

## 2025-06-11 DIAGNOSIS — J20.9 ACUTE BRONCHITIS, UNSPECIFIED ORGANISM: ICD-10-CM

## 2025-06-11 PROBLEM — B95.1 POSITIVE GBS TEST: Status: RESOLVED | Noted: 2017-09-05 | Resolved: 2025-06-11

## 2025-06-11 RX ORDER — PREDNISONE 10 MG/1
TABLET ORAL
Qty: 18 TABLET | Refills: 0 | Status: SHIPPED | OUTPATIENT
Start: 2025-06-11

## 2025-06-11 NOTE — PATIENT INSTRUCTIONS
Delay breastfeeding 24 hours after mammogram  Have labs drawn, fasting 8-10 hours prior (water before is ok).      Gynecologists we recommend:  Dr Radha Sheikh      Stretching and strengthening recommendations for knee pain:  Stretching exercises -- Depending upon the injury or condition causing pain, patients can begin stretching exercises as soon as the day following an injury. Stretch gently and gradually, holding consistent pressure at the end of the stretch. Avoid \"bouncing\" or rapid \"ballistic\" stretches as these can damage already injured tissue. Stretches should be held for 20 to 30 seconds and should be performed on both injured and uninjured legs. Each muscle should be stretched three to five times per session and stretching sessions may be performed one to four times each day.    Hamstring stretch -- Sit on the floor or bed with the affected leg extended straight out in front of you. The opposite leg may be bent or may hang off the bed. Keeping the affected leg straight, lean forward and reach for the ankle. Hold for 30 seconds but do not bounce. Sit up straight. Repeat as above.    Quadriceps stretch -- Stand behind a chair, holding the top of the chair with one hand. Bend the knee and grab the foot with the hand on the same side of the body. Stand up straight. Gently pull the foot towards the body. Hold for 30 seconds, holding constant pressure on the foot (do not pull-release-pull). Release the foot. Repeat 10 to 15 times.    Runner's stretch -- Face a wall and stand 18 to 24 inches away. Place hands at head height and lean into the wall, keeping legs and back straight. You can rest your head on your hands, against the wall. You should feel a stretch in the muscles in the back of the calf. Hold for 30 seconds. Repeat 10 to 15 times.    Strengthening exercises -- Rehabilitation of the knee almost always includes strengthening exercises. Patients  gradually progress from exercises performed with a straight knee (eg, straight leg raises) to exercises that require some degree of knee bending (eg, squats). Initial exercises often include the following:    Straight leg raises -- Sit on the edge of a chair or lie down on the back. Bend the opposite leg (picture 2). Keep the affected leg perfectly straight and raise it 3 to 4 inches off the ground. Hold for 5 seconds. Repeat 10 to 15 times (one set). Perform a total of three sets.    As your condition improves, perform straight leg raises with weights at the ankle; begin with a 2 pound weight and gradually increase to a 5 to 10 pound weight (pennies or fishing weights in an old sock, two cans in a purse, or Velcro ankle weights).    Hip abduction -- Lie on your side on the bed or floor. The affected leg should be on top and should be held straight. The bottom leg should be bent. Hold the top leg straight and raise it 3 to 4 inches towards the ceiling. Hold for 5 seconds then slowly release. Repeat 10 to 15 times (one set). Perform a total of three sets.    Hip adduction -- Lie on your side on the bed or floor. The affected leg should be on bottom and should be held straight. The top leg should be bent with the foot placed in front of the bottom leg. Lift the bottom leg 3 to 4 inches. Hold for 5 seconds then slowly release. Repeat 10 to 15 times (one set). Perform a total of three sets.    Quarter squats -- Stand 18 to 24 inches from a wall. Lean back against the wall. Bend both knees slightly (the buttocks should not be lower than the knees), keeping the back straight (picture 3). Hold for five seconds then slowly stand up straight. Rest as needed. Repeat 10 to 15 times (one set). Perform a total of three sets. To increase the difficulty, bend the knees more deeply, hold for a longer time, and increase the speed.    Alternately, use an exercise ball to perform squats. Stand up straight, holding the ball between  your back and the wall. Slowly bend the knees and lower the back (roll the ball down the wall). Hold for a count of five. Stand up. Repeat 10 to 15 times.

## 2025-06-11 NOTE — PROGRESS NOTES
The following individual(s) verbally consented to be recorded using ambient AI listening technology and understand that they can each withdraw their consent to this listening technology at any point by asking the clinician to turn off or pause the recording:    Patient name: Remedios Vasquez  Additional names:  none

## 2025-06-11 NOTE — PROGRESS NOTES
Wellness Exam    CC: Patient is presenting for a wellness exam    HPI:   Concerns:   History of Present Illness  Remedios Vasquez is a 39 year old female who presents with a lingering cough and for annual physical    She experiences an intermittent cough x weeks that began as dry and has become productive with mucus. The cough is less severe than four years ago when she had severe coughing attacks managed with prednisone and an antibiotic. There are no current cough attacks or vomiting.      Diet is balanced, exercise: regular, running.    Gyne:     Health Maintenance   Topic Date Due    Pap Smear  03/11/2022       Denies pelvic pain, abnormal discharge or genital lesions.    Breast Cancer Screening:    Health Maintenance   Topic Date Due    Mammogram  Never done          Bone Health: Last DEXA: n/a.     Colon cancer screening:  No recommendations at this time       Pertinent Family History: Family History[1]   Past Medical History[2]  Past Surgical History[3]  Short Social Hx on File[4]  Medications Ordered Prior to Encounter[5]  Tobacco:  She has never smoked tobacco.       Review of Systems   Constitutional: Negative for fever, chills and fatigue.   HENT: Negative for hearing loss, congestion, sore throat and neck pain.    Eyes: Negative for pain and visual disturbance.   Respiratory: Negative for cough and shortness of breath.    Cardiovascular: Negative for chest pain and palpitations.   Gastrointestinal: Negative for nausea, vomiting, abdominal pain and diarrhea.   Genitourinary: Negative for urgency, frequency of urination, and abnormal vaginal bleeding.   Musculoskeletal: Negative for arthralgias and gait problem.   Skin: Negative for color change and rash.   Neurological: Negative for tremors, weakness and numbness.   Hematological: Negative for adenopathy. Does not bruise/bleed easily.   Psychiatric/Behavioral: Negative for confusion and agitation. The patient is not nervous/anxious.      BP  108/60   Pulse 78   Temp 98.2 °F (36.8 °C)   Resp 18   Ht 5' 7\" (1.702 m)   Wt 135 lb (61.2 kg)   LMP 06/05/2025 (Approximate)   SpO2 99%   Breastfeeding Yes   BMI 21.14 kg/m²   Physical Exam   Constitutional: She is oriented to person, place, and time. She appears well-developed. No distress.    Head: Normocephalic and atraumatic.   Eyes: EOM are normal. Pupils are equal, round, and reactive to light. No scleral icterus.   ENT: TM's clear, nose normal, no oropharyngeal exudates or tonsillar hypertrophy    Neck: Normal range of motion. No thyromegaly present.   Cardiovascular: Normal rate, regular rhythm and normal heart sounds.  No murmur or friction rub heard.  Pulmonary/Chest: Effort normal and breath sounds normal bilaterally. She has no wheezes or rales.   Breasts: defer to gyn   Abdominal: Soft. Bowel sounds are normal. There is no tenderness. No HSM.  Musculoskeletal: Normal range of motion. She exhibits no edema.   Lymphadenopathy: She has no cervical, supraclavicular, or axillary adenopathy.   : defer to gyn  Neurological: She is alert and oriented to person, place, and time. DTRs are +2 and symmetric. Cranial nerves grossly intact.  Skin: Skin is warm. No rash noted. No erythema, pallor or jaundice.   Psychiatric: She has a normal mood and affect and her behavior is normal.     Assessment and Plan:  Remedios Vasquez is a 39 year old female here for a wellness exam.  Age appropriate cancer screening, labs, safety, immunizations were discussed with the patient and ordered as follows:  1. Routine physical examination (Primary)  2. Encounter for screening mammogram for malignant neoplasm of breast  Patient occasionally breastfeeds her 4 yr old but not regularly, has FH breast cancer, agrees to initiate breast cancer screening now. Discussed holding off breastfeeding 24h after mammo out of abundance of caution.  -     [facility] MARGO 2D+3D SCREENING BILAT (CPT=77067/16504); Future; Expected date:  2025  3. Laboratory exam ordered as part of routine general medical examination  -     CBC With Differential With Platelet  -     Comp Metabolic Panel (14)  -     Lipid Panel  -     TSH W Reflex To Free T4  -     Urinalysis, Routine  4. Acute bronchitis, unspecified organism  -     predniSONE; In the morning with food: take 3 tabs days 1-3, 2 tabs days 4-6, 1 tab days 7-9, then stop.  Dispense: 18 tablet; Refill: 0     Pt will schedule with gyne for pap   Discussed use of sunscreen, wearing seatbelt, recommend regular cardiovascular and weight bearing exercise as well as a well-rounded diet.    Return in about 1 year (around 2026) for routine physical, or sooner as needed.     Patient/Caregiver Education:  Patient/Caregiver Education: There are no barriers to learning. Medical education done.  Outcome: Patient verbalizes understanding.       Educated by: [facility]         [1]   Family History  Problem Relation Age of Onset    Colon Cancer Father 60    Breast Cancer Maternal Grandmother         age 50's    Cancer Maternal Grandmother         Breast, mid-50's   [2]   Past Medical History:   Amenorrhea    Irregular menses prior to 1st pregnancy    Bronchospasm    COVID    Dyspnea    Normal labor (HCC)     (normal spontaneous vaginal delivery) (HCC)    Positive GBS test    PCN allergic - clindamycin or vancomycin.     [3] No past surgical history on file.  [4]   Social History  Socioeconomic History    Marital status:      Spouse name: Oscar Vasquez    Number of children: 1    Years of education: 18   Occupational History    Occupation: Manager     Comment: Full time   Tobacco Use    Smoking status: Never    Smokeless tobacco: Never   Vaping Use    Vaping status: Never Used   Substance and Sexual Activity    Alcohol use: No    Drug use: No   Other Topics Concern    Blood Transfusions No    Caffeine Concern No    Occupational Exposure No    Weight Concern No    Special Diet No    Exercise No    Bike  Helmet Yes    Seat Belt Yes     Social Drivers of Health      Received from [facility]    Housing Stability   [5]   No current outpatient medications on file prior to visit.     No current facility-administered medications on file prior to visit.

## 2025-08-20 ENCOUNTER — RESULTS FOLLOW-UP (OUTPATIENT)
Dept: INTERNAL MEDICINE CLINIC | Facility: CLINIC | Age: 40
End: 2025-08-20

## 2025-08-20 ENCOUNTER — LAB ENCOUNTER (OUTPATIENT)
Dept: LAB | Age: 40
End: 2025-08-20
Attending: PHYSICIAN ASSISTANT

## 2025-08-20 DIAGNOSIS — K80.20 GALL STONES: Primary | ICD-10-CM

## 2025-08-20 LAB
ALBUMIN SERPL-MCNC: 4.2 G/DL (ref 3.2–4.8)
ALBUMIN/GLOB SERPL: 1.5 (ref 1–2)
ALP LIVER SERPL-CCNC: 54 U/L (ref 37–98)
ALT SERPL-CCNC: 9 U/L (ref 10–49)
ANION GAP SERPL CALC-SCNC: 8 MMOL/L (ref 0–18)
AST SERPL-CCNC: 20 U/L (ref ?–34)
BASOPHILS # BLD AUTO: 0.03 X10(3) UL (ref 0–0.2)
BASOPHILS NFR BLD AUTO: 0.4 %
BILIRUB SERPL-MCNC: 1.3 MG/DL (ref 0.3–1.2)
BILIRUB UR QL STRIP.AUTO: NEGATIVE
BUN BLD-MCNC: 11 MG/DL (ref 9–23)
CALCIUM BLD-MCNC: 8.9 MG/DL (ref 8.7–10.6)
CHLORIDE SERPL-SCNC: 107 MMOL/L (ref 98–112)
CHOLEST SERPL-MCNC: 142 MG/DL (ref ?–200)
CLARITY UR REFRACT.AUTO: CLEAR
CO2 SERPL-SCNC: 25 MMOL/L (ref 21–32)
CREAT BLD-MCNC: 0.91 MG/DL (ref 0.55–1.02)
EGFRCR SERPLBLD CKD-EPI 2021: 82 ML/MIN/1.73M2 (ref 60–?)
EOSINOPHIL # BLD AUTO: 0.23 X10(3) UL (ref 0–0.7)
EOSINOPHIL NFR BLD AUTO: 3.2 %
ERYTHROCYTE [DISTWIDTH] IN BLOOD BY AUTOMATED COUNT: 12.6 %
FASTING PATIENT LIPID ANSWER: YES
FASTING STATUS PATIENT QL REPORTED: YES
GLOBULIN PLAS-MCNC: 2.8 G/DL (ref 2–3.5)
GLUCOSE BLD-MCNC: 89 MG/DL (ref 70–99)
GLUCOSE UR STRIP.AUTO-MCNC: NORMAL MG/DL
HCT VFR BLD AUTO: 38.7 % (ref 35–48)
HDLC SERPL-MCNC: 49 MG/DL (ref 40–59)
HGB BLD-MCNC: 13.2 G/DL (ref 12–16)
IMM GRANULOCYTES # BLD AUTO: 0.01 X10(3) UL (ref 0–1)
IMM GRANULOCYTES NFR BLD: 0.1 %
KETONES UR STRIP.AUTO-MCNC: NEGATIVE MG/DL
LDLC SERPL CALC-MCNC: 80 MG/DL (ref ?–100)
LEUKOCYTE ESTERASE UR QL STRIP.AUTO: NEGATIVE
LYMPHOCYTES # BLD AUTO: 1.53 X10(3) UL (ref 1–4)
LYMPHOCYTES NFR BLD AUTO: 21.6 %
MCH RBC QN AUTO: 30.1 PG (ref 26–34)
MCHC RBC AUTO-ENTMCNC: 34.1 G/DL (ref 31–37)
MCV RBC AUTO: 88.4 FL (ref 80–100)
MONOCYTES # BLD AUTO: 0.66 X10(3) UL (ref 0.1–1)
MONOCYTES NFR BLD AUTO: 9.3 %
NEUTROPHILS # BLD AUTO: 4.62 X10 (3) UL (ref 1.5–7.7)
NEUTROPHILS # BLD AUTO: 4.62 X10(3) UL (ref 1.5–7.7)
NEUTROPHILS NFR BLD AUTO: 65.4 %
NITRITE UR QL STRIP.AUTO: NEGATIVE
NONHDLC SERPL-MCNC: 93 MG/DL (ref ?–130)
OSMOLALITY SERPL CALC.SUM OF ELEC: 289 MOSM/KG (ref 275–295)
PH UR STRIP.AUTO: 5.5 (ref 5–8)
PLATELET # BLD AUTO: 264 10(3)UL (ref 150–450)
POTASSIUM SERPL-SCNC: 4.2 MMOL/L (ref 3.5–5.1)
PROT SERPL-MCNC: 7 G/DL (ref 5.7–8.2)
PROT UR STRIP.AUTO-MCNC: NEGATIVE MG/DL
RBC # BLD AUTO: 4.38 X10(6)UL (ref 3.8–5.3)
RBC UR QL AUTO: NEGATIVE
SODIUM SERPL-SCNC: 140 MMOL/L (ref 136–145)
SP GR UR STRIP.AUTO: 1.03 (ref 1–1.03)
TRIGL SERPL-MCNC: 65 MG/DL (ref 30–149)
TSI SER-ACNC: 2.61 UIU/ML (ref 0.55–4.78)
UROBILINOGEN UR STRIP.AUTO-MCNC: NORMAL MG/DL
VLDLC SERPL CALC-MCNC: 10 MG/DL (ref 0–30)
WBC # BLD AUTO: 7.1 X10(3) UL (ref 4–11)

## 2025-08-20 PROCEDURE — 81003 URINALYSIS AUTO W/O SCOPE: CPT | Performed by: PHYSICIAN ASSISTANT

## 2025-08-20 PROCEDURE — 80061 LIPID PANEL: CPT | Performed by: PHYSICIAN ASSISTANT

## 2025-08-20 PROCEDURE — 80050 GENERAL HEALTH PANEL: CPT | Performed by: PHYSICIAN ASSISTANT

## 2025-08-27 ENCOUNTER — HOSPITAL ENCOUNTER (OUTPATIENT)
Dept: MAMMOGRAPHY | Age: 40
Discharge: HOME | End: 2025-08-27
Attending: PHYSICIAN ASSISTANT

## 2025-08-27 DIAGNOSIS — Z12.31 ENCOUNTER FOR SCREENING MAMMOGRAM FOR MALIGNANT NEOPLASM OF BREAST: ICD-10-CM

## 2025-08-27 PROCEDURE — 77063 BREAST TOMOSYNTHESIS BI: CPT | Performed by: PHYSICIAN ASSISTANT

## 2025-08-27 PROCEDURE — 77067 SCR MAMMO BI INCL CAD: CPT | Performed by: PHYSICIAN ASSISTANT

## (undated) NOTE — LETTER
Date: 6/3/2022    Patient Name: Nehemias Charlton          To Whom it may concern:    Page Cooper is recovered from [address]. She tested positive 05/22/2022. She will be traveling within 90 day period in which she can continue to test positive. She is not required to test to reenter the [place] [facility]. If there are any questions, please call the office.           Sincerely,    YAN Marrero

## (undated) NOTE — LETTER
[address] [address]  [phone]                19      Remedios Peterson  :  1985      Nebulizer, Tubing and Mouth Piece    Dx:  Kristina Reynoso